# Patient Record
(demographics unavailable — no encounter records)

---

## 2025-06-13 NOTE — PHYSICAL EXAM
[Normocephalic] : normocephalic [EOMI] : extra ocular movement intact [Supple] : supple [No Supraclavicular Adenopathy] : no supraclavicular adenopathy [No Cervical Adenopathy] : no cervical adenopathy [Normal Sinus Rhythm] : normal sinus rhythm [Examined in the supine and seated position] : examined in the supine and seated position [No dominant masses] : no dominant masses in right breast  [No dominant masses] : no dominant masses left breast [No Nipple Retraction] : no left nipple retraction [No Nipple Discharge] : no left nipple discharge [Breast Mass Right Breast ___cm] : no masses [Breast Mass Left Breast ___cm] : no masses [No Axillary Lymphadenopathy] : no left axillary lymphadenopathy [No Edema] : no edema [No Rashes] : no rashes [No Ulceration] : no ulceration [Breast Nipple Inversion] : nipples not inverted [Breast Nipple Retraction] : nipples not retracted [Breast Nipple Flattening] : nipples not flattened [Breast Nipple Fissures] : nipples not fissured [Breast Abnormal Lactation (Galactorrhea)] : no galactorrhea [Breast Abnormal Secretion Bloody Fluid] : no bloody discharge [Breast Abnormal Secretion Serous Fluid] : no serous discharge [Breast Abnormal Secretion Opalescent Fluid] : no milky discharge [de-identified] : On exam, the patient has undergone bilateral reduction mastopexy surgery performed on March 3, 2022 at [facility].  She has had a significant reduction in her breast size and is now about a B-cup.  On palpation I cannot feel any suspicious densities in either breast she does have some scarring changes now bilaterally.  She has no axillary, supraclavicular, or cervical adenopathy. [de-identified] : Status post reduction mastopexy with scarring changes but no suspicious findings. [de-identified] : Status post reduction mastopexy with scarring changes but no suspicious findings

## 2025-06-13 NOTE — ADDENDUM
[FreeTextEntry1] : I spent greater than 75% of consultation in face-to-face counseling and coordination of care in this patient at an increased risk of breast cancer who comes in for routine breast cancer screening/surveillance.

## 2025-06-13 NOTE — PAST MEDICAL HISTORY
[Menstruating] : The patient is menstruating [Menarche Age ____] : age at menarche was [unfilled] [Definite ___ (Date)] : the last menstrual period was [unfilled] [Total Preg ___] : G[unfilled] [Live Births ___] : P[unfilled]  [Premature ___] : Premature: [unfilled] [Age At Live Birth ___] : Age at live birth: [unfilled] [History of Hormone Replacement Treatment] : has no history of hormone replacement treatment [de-identified] : [facility] 01/09/21

## 2025-06-13 NOTE — HISTORY OF PRESENT ILLNESS
[FreeTextEntry1] : The patient is a 45-year-old premenopausal G3, P2 white female.  She underwent menarche at age 16 and had a first child at age 31.  She has a family history of breast cancer with her sister who had breast cancer in her 40s and tested BRCA negative.  She has 2 paternal aunts with breast cancer one at age 36 and one at age 50.  Her father had prostate cancer at age 68 and she has a paternal uncle who had stomach cancer at age 62.  Her father also has a history of melanoma.  She is the daughter of Livier Vyas.  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 The patient underwent a significant bilateral reduction mastopexy surgery at [facility] on March 3, 2022 and she was found to have some atypical lobular hyperplasia in some of the right breast medial breast tissue. The patient comes in for routine follow-up with her strong family history and history of fibrocystic mastopathy and gets yearly mammography and ultrasound and MRI's.

## 2025-06-13 NOTE — ASSESSMENT
[FreeTextEntry1] : The patient is a 45-year-old premenopausal G3, P2 white female. She has a family history of breast cancer with her sister who had breast cancer in her 40s and tested BRCA negative.  She has 2 paternal aunts with breast cancer one at age 36 and one at age 50.  Her father had prostate cancer at age 68 and she has a paternal uncle who had stomach cancer at age 62.  Her father also has a history of melanoma.  She is the daughter of Livier Vyas.  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  The patient underwent a significant bilateral reduction mastopexy surgery at [facility] on March 3, 2022 and she was found to have some atypical lobular hyperplasia in some of the right breast medial breast tissue.  On exam today, she has some fibroglandular nodularity but no suspicious masses.  She underwent her last bilateral breast MRI which was performed on September 17, 2024 at [facility] which showed no suspicious findings.  Her last mammography and ultrasound was performed on ???????? February 2, 2024 at [facility] which showed some stable bilateral cystic findings and extremely dense breasts with some likely benign calcifications in the left breast upper outer and upper inner aspects and follow-up diagnostic left breast mammography was performed on July 30, 2024 at [facility] showing these left breast calcifications to be probably benign and they were unchanged.  I would like to see her again in 1 year in July 2026 and she will be due for her next routine bilateral mammography and ultrasound again in ??????? February 2026 and she was given prescriptions.  I would also like her to get her routine screening MRI again in September 2025 and she was given a prescription and I will follow-up on the results.  The patient understands and agrees to proceed as planned.

## 2025-07-22 NOTE — PAST MEDICAL HISTORY
[Menstruating] : The patient is menstruating [Menarche Age ____] : age at menarche was [unfilled] [History of Hormone Replacement Treatment] : has no history of hormone replacement treatment [Definite ___ (Date)] : the last menstrual period was [unfilled] [Total Preg ___] : G[unfilled] [Live Births ___] : P[unfilled]  [Premature ___] : Premature: [unfilled] [Age At Live Birth ___] : Age at live birth: [unfilled] [de-identified] : [facility] 01/09/21

## 2025-07-22 NOTE — PHYSICAL EXAM
[Normocephalic] : normocephalic [EOMI] : extra ocular movement intact [Supple] : supple [No Supraclavicular Adenopathy] : no supraclavicular adenopathy [No Cervical Adenopathy] : no cervical adenopathy [Normal Sinus Rhythm] : normal sinus rhythm [Examined in the supine and seated position] : examined in the supine and seated position [No dominant masses] : no dominant masses in right breast  [No dominant masses] : no dominant masses left breast [No Nipple Retraction] : no left nipple retraction [No Nipple Discharge] : no left nipple discharge [Breast Mass Right Breast ___cm] : no masses [Breast Mass Left Breast ___cm] : no masses [Breast Nipple Inversion] : nipples not inverted [Breast Nipple Retraction] : nipples not retracted [Breast Nipple Flattening] : nipples not flattened [Breast Nipple Fissures] : nipples not fissured [Breast Abnormal Lactation (Galactorrhea)] : no galactorrhea [Breast Abnormal Secretion Bloody Fluid] : no bloody discharge [Breast Abnormal Secretion Serous Fluid] : no serous discharge [Breast Abnormal Secretion Opalescent Fluid] : no milky discharge [No Axillary Lymphadenopathy] : no left axillary lymphadenopathy [No Edema] : no edema [No Rashes] : no rashes [No Ulceration] : no ulceration [de-identified] : On exam, the patient has undergone bilateral reduction mastopexy surgery performed on March 3, 2022 at [facility].  She has had a significant reduction in her breast size and is now about a B-cup.  On palpation I cannot feel any suspicious densities in either breast she does have some scarring changes now bilaterally.  She has no axillary, supraclavicular, or cervical adenopathy. [de-identified] : Status post reduction mastopexy with scarring changes but no suspicious findings. [de-identified] : Status post reduction mastopexy with scarring changes but no suspicious findings

## 2025-07-22 NOTE — HISTORY OF PRESENT ILLNESS
[FreeTextEntry1] : The patient is a 45-year-old premenopausal G3, P2 white female.  She underwent menarche at age 16 and had a first child at age 31.  She has a family history of breast cancer with her sister who had breast cancer in her 40s and tested BRCA negative.  She has 2 paternal aunts with breast cancer one at age 36 and one at age 50.  Her father had prostate cancer at age 68 and she has a paternal uncle who had stomach cancer at age 62.  Her father also has a history of melanoma.  She is the daughter of Livier Vyas.  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 The patient underwent a significant bilateral reduction mastopexy surgery at [facility] on March 3, 2022 and she was found to have some atypical lobular hyperplasia in some of the right breast medial breast tissue. The patient comes in for routine follow-up with her strong family history and history of fibrocystic mastopathy and gets yearly mammography and ultrasound and MRI's.

## 2025-07-22 NOTE — REASON FOR VISIT
[Follow-Up: _____] : a [unfilled] follow-up visit [FreeTextEntry1] : She comes in for routine follow-up with a strong family history of breast cancer and melanoma and a history of fibrocystic mastopathy.

## 2025-07-22 NOTE — ASSESSMENT
[FreeTextEntry1] : The patient is a 45-year-old premenopausal G3, P2 white female. She has a family history of breast cancer with her sister who had breast cancer in her 40s and tested BRCA negative.  She has 2 paternal aunts with breast cancer one at age 36 and one at age 50.  Her father had prostate cancer at age 68 and she has a paternal uncle who had stomach cancer at age 62.  Her father also has a history of melanoma.  She is the daughter of Livier Vyas.  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  The patient underwent a significant bilateral reduction mastopexy surgery at [facility] on March 3, 2022 and she was found to have some atypical lobular hyperplasia in some of the right breast medial breast tissue.  On exam today, she has some fibroglandular nodularity but no suspicious masses.  She underwent her last bilateral breast MRI which was performed on September 17, 2024 at [facility] which showed no suspicious findings.  Her last mammography and ultrasound was performed on ???????? February 2, 2024 at [facility] which showed some stable bilateral cystic findings and extremely dense breasts with some likely benign calcifications in the left breast upper outer and upper inner aspects and follow-up diagnostic left breast mammography was performed on July 30, 2024 at [facility] showing these left breast calcifications to be probably benign and they were unchanged.  I would like to see her again in 1 year in July 2026 and she will be due for her next routine bilateral mammography and ultrasound again in ??????? February 2026 and she was given prescriptions.  I would also like her to get her routine screening MRI again in September 2025 and she was given a prescription and I will follow-up on the results.  The patient understands and agrees to proceed as planned.